# Patient Record
Sex: FEMALE | Race: BLACK OR AFRICAN AMERICAN | Employment: UNEMPLOYED | URBAN - METROPOLITAN AREA
[De-identification: names, ages, dates, MRNs, and addresses within clinical notes are randomized per-mention and may not be internally consistent; named-entity substitution may affect disease eponyms.]

---

## 2023-11-09 ENCOUNTER — OFFICE VISIT (OUTPATIENT)
Dept: URGENT CARE | Age: 48
End: 2023-11-09

## 2023-11-09 VITALS
SYSTOLIC BLOOD PRESSURE: 164 MMHG | HEART RATE: 80 BPM | TEMPERATURE: 98.7 F | WEIGHT: 150 LBS | BODY MASS INDEX: 24.99 KG/M2 | OXYGEN SATURATION: 98 % | HEIGHT: 65 IN | DIASTOLIC BLOOD PRESSURE: 100 MMHG

## 2023-11-09 DIAGNOSIS — H66.92 ACUTE OTITIS MEDIA, LEFT: Primary | ICD-10-CM

## 2023-11-09 DIAGNOSIS — I10 ELEVATED BLOOD PRESSURE READING IN OFFICE WITH DIAGNOSIS OF HYPERTENSION: ICD-10-CM

## 2023-11-09 PROBLEM — G57.01 PIRIFORMIS SYNDROME OF RIGHT SIDE: Status: ACTIVE | Noted: 2017-08-18

## 2023-11-09 PROBLEM — O00.109 TUBAL ECTOPIC PREGNANCY: Status: ACTIVE | Noted: 2017-04-04

## 2023-11-09 PROBLEM — N97.9 FEMALE INFERTILITY: Status: ACTIVE | Noted: 2017-09-26

## 2023-11-09 PROBLEM — D17.1 LIPOMA OF BACK: Status: ACTIVE | Noted: 2017-09-26

## 2023-11-09 RX ORDER — AMLODIPINE BESYLATE 10 MG/1
TABLET ORAL DAILY
COMMUNITY
Start: 2021-12-23

## 2023-11-09 RX ORDER — AMOXICILLIN AND CLAVULANATE POTASSIUM 875; 125 MG/1; MG/1
1 TABLET, FILM COATED ORAL 2 TIMES DAILY
Qty: 20 TABLET | Refills: 0 | Status: SHIPPED | OUTPATIENT
Start: 2023-11-09 | End: 2023-11-19

## 2023-11-09 RX ORDER — CHLORTHALIDONE 25 MG/1
TABLET ORAL DAILY
COMMUNITY
Start: 2021-12-23

## 2023-11-09 ASSESSMENT — ENCOUNTER SYMPTOMS
DIARRHEA: 0
SINUS PAIN: 0
SHORTNESS OF BREATH: 0
VOMITING: 0
ABDOMINAL PAIN: 0
COUGH: 0
CONSTIPATION: 0
SORE THROAT: 0
CHEST TIGHTNESS: 0
SINUS PRESSURE: 0
NAUSEA: 0

## 2023-11-09 NOTE — PATIENT INSTRUCTIONS
Please take antibiotics as prescribed  Please take home blood pressure medication  Please follow up with your family doctor.